# Patient Record
Sex: FEMALE | URBAN - METROPOLITAN AREA
[De-identification: names, ages, dates, MRNs, and addresses within clinical notes are randomized per-mention and may not be internally consistent; named-entity substitution may affect disease eponyms.]

---

## 2022-01-09 ENCOUNTER — NURSE TRIAGE (OUTPATIENT)
Dept: ADMINISTRATIVE | Facility: CLINIC | Age: 32
End: 2022-01-09

## 2022-01-09 NOTE — TELEPHONE ENCOUNTER
Found out she was pregnant recently, stomach pain all night 5-6/10, blood noted when she used the bathroom, some in urine as well as on tissue, advised per protocol  Reason for Disposition   MODERATE-SEVERE abdominal pain (e.g., interferes with normal activities, awakens from sleep)    Additional Information   Negative: Passed out (i.e., lost consciousness, collapsed and was not responding)   Negative: Shock suspected (e.g., cold/pale/clammy skin, too weak to stand, low BP, rapid pulse)   Negative: Difficult to awaken or acting confused (e.g., disoriented, slurred speech)   Negative: Sounds like a life-threatening emergency to the triager    Protocols used: PREGNANCY - ABDOMINAL PAIN LESS THAN 20 WEEKS EGA-A-